# Patient Record
Sex: MALE | URBAN - METROPOLITAN AREA
[De-identification: names, ages, dates, MRNs, and addresses within clinical notes are randomized per-mention and may not be internally consistent; named-entity substitution may affect disease eponyms.]

---

## 2022-12-15 ENCOUNTER — OFFICE VISIT (OUTPATIENT)
Dept: URBAN - METROPOLITAN AREA CLINIC 84 | Facility: CLINIC | Age: 22
End: 2022-12-15
Payer: COMMERCIAL

## 2022-12-15 VITALS
WEIGHT: 159 LBS | SYSTOLIC BLOOD PRESSURE: 136 MMHG | TEMPERATURE: 97.4 F | DIASTOLIC BLOOD PRESSURE: 83 MMHG | HEIGHT: 72 IN | BODY MASS INDEX: 21.54 KG/M2 | HEART RATE: 73 BPM

## 2022-12-15 DIAGNOSIS — R79.89 ELEVATED LFTS: ICD-10-CM

## 2022-12-15 PROCEDURE — 99204 OFFICE O/P NEW MOD 45 MIN: CPT | Performed by: INTERNAL MEDICINE

## 2022-12-15 RX ORDER — EMTRICITABINE AND TENOFOVIR DISOPROXIL FUMARATE 200; 300 MG/1; MG/1
1 TABLET TABLET, FILM COATED ORAL ONCE A DAY
Status: ACTIVE | COMMUNITY

## 2022-12-15 NOTE — HPI-TODAY'S VISIT:
This patient was referred by Dr. German Cardoso for an evaluation of elevated LFT's.  A copy of this will be sent to the referring provider.  Recent labs revealed elevated LFT's.  Repeat labs 2 weeks later showed normal LFT's.  He thinks that he had an STD at the time that he had elevated LFT's.  Overall he feels well.  He denies anroexia or weight loss.  He denies abdominal pain.  He denies UGI symptoms.  He denies LGI symptoms.  He denies LGI Bleed or melena.  He denies regular alcohol.  He denies illicit drugs.  He denies symptoms of chronic liver disease. He has not had an US

## 2022-12-16 LAB
ALBUMIN/GLOBULIN RATIO: 1.4
ALBUMIN: 4.5
ALKALINE PHOSPHATASE: 87
ALT (SGPT): 20
AST (SGOT): 27
BILIRUBIN, DIRECT: 0.2
BILIRUBIN, INDIRECT: 0.5
BILIRUBIN, TOTAL: 0.7
GLOBULIN: 3.3
HBSAG SCREEN: (no result)
HEP A AB, IGM: (no result)
HEP B CORE AB, IGM: (no result)
HEP C VIRUS AB: <0.02
HEPATITIS B SURFACE AB IMMUNITY, QN: >1000
HEPATITIS C ANTIBODY: (no result)
PROTEIN, TOTAL: 7.8

## 2022-12-23 ENCOUNTER — DASHBOARD ENCOUNTERS (OUTPATIENT)
Age: 22
End: 2022-12-23